# Patient Record
Sex: FEMALE | Race: BLACK OR AFRICAN AMERICAN | NOT HISPANIC OR LATINO | Employment: FULL TIME | URBAN - METROPOLITAN AREA
[De-identification: names, ages, dates, MRNs, and addresses within clinical notes are randomized per-mention and may not be internally consistent; named-entity substitution may affect disease eponyms.]

---

## 2021-03-11 ENCOUNTER — HOSPITAL ENCOUNTER (OUTPATIENT)
Facility: MEDICAL CENTER | Age: 26
End: 2021-03-11
Attending: SPECIALIST
Payer: MEDICAID

## 2021-03-11 LAB — CYTOLOGY REG CYTOL: NORMAL

## 2021-03-11 PROCEDURE — 88175 CYTOPATH C/V AUTO FLUID REDO: CPT

## 2021-05-20 ENCOUNTER — HOSPITAL ENCOUNTER (EMERGENCY)
Facility: MEDICAL CENTER | Age: 26
End: 2021-05-21
Attending: SPECIALIST | Admitting: SPECIALIST
Payer: MEDICAID

## 2021-05-20 ENCOUNTER — APPOINTMENT (OUTPATIENT)
Dept: RADIOLOGY | Facility: MEDICAL CENTER | Age: 26
End: 2021-05-20
Attending: SPECIALIST
Payer: MEDICAID

## 2021-05-20 VITALS
DIASTOLIC BLOOD PRESSURE: 65 MMHG | TEMPERATURE: 97.6 F | HEIGHT: 63 IN | BODY MASS INDEX: 32.07 KG/M2 | HEART RATE: 97 BPM | SYSTOLIC BLOOD PRESSURE: 123 MMHG | WEIGHT: 181 LBS

## 2021-05-20 PROCEDURE — 76815 OB US LIMITED FETUS(S): CPT

## 2021-05-20 PROCEDURE — 302449 STATCHG TRIAGE ONLY (STATISTIC)

## 2021-05-20 ASSESSMENT — PAIN SCALES - GENERAL: PAINLEVEL: 5 - MODERATE PAIN

## 2021-05-20 ASSESSMENT — PAIN DESCRIPTION - PAIN TYPE: TYPE: ACUTE PAIN
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
## 2021-05-21 NOTE — DISCHARGE INSTRUCTIONS
Please return for any signs of labor or bleeding vaginally.  Follow up with your OB in the am on Friday 5/21/21.  You may take OTC tylenol for pain and use any comfort measures that work for you, massage, ice pack or heat packs.  Call your OB with any concerns.
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
## 2021-06-07 LAB
ABO GROUP BLD: NORMAL
BLD GP AB SCN SERPL QL: NORMAL
HBV SURFACE AG SERPL QL IA: NON REACTIVE
HIV 1+2 AB+HIV1 P24 AG SERPL QL IA: NON REACTIVE
RH BLD: POSITIVE
RUBV IGG SERPL IA-ACNC: NORMAL
TREPONEMA PALLIDUM IGG+IGM AB [PRESENCE] IN SERUM OR PLASMA BY IMMUNOASSAY: NON REACTIVE

## 2021-06-21 DIAGNOSIS — D50.9 IRON DEFICIENCY ANEMIA, UNSPECIFIED IRON DEFICIENCY ANEMIA TYPE: ICD-10-CM

## 2021-06-21 RX ORDER — SODIUM CHLORIDE 9 MG/ML
INJECTION, SOLUTION INTRAVENOUS CONTINUOUS
Status: CANCELLED | OUTPATIENT
Start: 2021-06-22

## 2021-06-21 RX ORDER — DIPHENHYDRAMINE HYDROCHLORIDE 50 MG/ML
50 INJECTION INTRAMUSCULAR; INTRAVENOUS PRN
Status: CANCELLED | OUTPATIENT
Start: 2021-06-22

## 2021-06-21 RX ORDER — EPINEPHRINE 1 MG/ML(1)
0.5 AMPUL (ML) INJECTION PRN
Status: CANCELLED | OUTPATIENT
Start: 2021-06-22

## 2021-06-21 RX ORDER — METHYLPREDNISOLONE SODIUM SUCCINATE 125 MG/2ML
125 INJECTION, POWDER, LYOPHILIZED, FOR SOLUTION INTRAMUSCULAR; INTRAVENOUS PRN
Status: CANCELLED | OUTPATIENT
Start: 2021-06-22

## 2021-06-24 ENCOUNTER — OUTPATIENT INFUSION SERVICES (OUTPATIENT)
Dept: ONCOLOGY | Facility: MEDICAL CENTER | Age: 26
End: 2021-06-24
Attending: SPECIALIST
Payer: MEDICAID

## 2021-06-24 VITALS
RESPIRATION RATE: 18 BRPM | OXYGEN SATURATION: 98 % | HEIGHT: 64 IN | SYSTOLIC BLOOD PRESSURE: 125 MMHG | DIASTOLIC BLOOD PRESSURE: 59 MMHG | BODY MASS INDEX: 31.99 KG/M2 | HEART RATE: 112 BPM | WEIGHT: 187.39 LBS | TEMPERATURE: 98 F

## 2021-06-24 DIAGNOSIS — D50.9 IRON DEFICIENCY ANEMIA, UNSPECIFIED IRON DEFICIENCY ANEMIA TYPE: ICD-10-CM

## 2021-06-24 LAB
FERRITIN SERPL-MCNC: 8.3 NG/ML (ref 10–291)
IRON SATN MFR SERPL: 5 % (ref 15–55)
IRON SERPL-MCNC: 24 UG/DL (ref 40–170)
TIBC SERPL-MCNC: 443 UG/DL (ref 250–450)
UIBC SERPL-MCNC: 419 UG/DL (ref 110–370)

## 2021-06-24 PROCEDURE — 83550 IRON BINDING TEST: CPT

## 2021-06-24 PROCEDURE — 96375 TX/PRO/DX INJ NEW DRUG ADDON: CPT

## 2021-06-24 PROCEDURE — 96365 THER/PROPH/DIAG IV INF INIT: CPT

## 2021-06-24 PROCEDURE — 700111 HCHG RX REV CODE 636 W/ 250 OVERRIDE (IP): Performed by: SPECIALIST

## 2021-06-24 PROCEDURE — 82728 ASSAY OF FERRITIN: CPT

## 2021-06-24 PROCEDURE — 83540 ASSAY OF IRON: CPT

## 2021-06-24 PROCEDURE — 36415 COLL VENOUS BLD VENIPUNCTURE: CPT

## 2021-06-24 PROCEDURE — 700105 HCHG RX REV CODE 258: Performed by: SPECIALIST

## 2021-06-24 RX ORDER — METHYLPREDNISOLONE SODIUM SUCCINATE 125 MG/2ML
125 INJECTION, POWDER, LYOPHILIZED, FOR SOLUTION INTRAMUSCULAR; INTRAVENOUS PRN
Status: CANCELLED | OUTPATIENT
Start: 2021-06-24

## 2021-06-24 RX ORDER — DOCOSAHEXAENOIC ACID 200 MG
CAPSULE ORAL
COMMUNITY
Start: 2021-06-10 | End: 2022-03-24

## 2021-06-24 RX ORDER — ASPIRIN 81 MG/1
81 TABLET, CHEWABLE ORAL DAILY
COMMUNITY
End: 2022-03-24

## 2021-06-24 RX ORDER — SODIUM CHLORIDE 9 MG/ML
INJECTION, SOLUTION INTRAVENOUS CONTINUOUS
Status: CANCELLED | OUTPATIENT
Start: 2021-06-24

## 2021-06-24 RX ORDER — EPINEPHRINE 1 MG/ML(1)
0.5 AMPUL (ML) INJECTION PRN
Status: CANCELLED | OUTPATIENT
Start: 2021-06-24

## 2021-06-24 RX ORDER — METHYLPREDNISOLONE SODIUM SUCCINATE 125 MG/2ML
125 INJECTION, POWDER, LYOPHILIZED, FOR SOLUTION INTRAMUSCULAR; INTRAVENOUS PRN
Status: DISCONTINUED | OUTPATIENT
Start: 2021-06-24 | End: 2021-06-24 | Stop reason: ALTCHOICE

## 2021-06-24 RX ORDER — SODIUM CHLORIDE 9 MG/ML
INJECTION, SOLUTION INTRAVENOUS CONTINUOUS
Status: DISCONTINUED | OUTPATIENT
Start: 2021-06-24 | End: 2021-06-24 | Stop reason: ALTCHOICE

## 2021-06-24 RX ORDER — FERROUS SULFATE 325(65) MG
TABLET ORAL
COMMUNITY
Start: 2021-06-14 | End: 2022-03-24

## 2021-06-24 RX ORDER — ALBUTEROL SULFATE 90 UG/1
1-2 AEROSOL, METERED RESPIRATORY (INHALATION) EVERY 4 HOURS PRN
COMMUNITY
Start: 2021-06-07

## 2021-06-24 RX ORDER — DIPHENHYDRAMINE HYDROCHLORIDE 50 MG/ML
50 INJECTION INTRAMUSCULAR; INTRAVENOUS PRN
Status: CANCELLED | OUTPATIENT
Start: 2021-06-24

## 2021-06-24 RX ADMIN — METHYLPREDNISOLONE SODIUM SUCCINATE 125 MG: 125 INJECTION, POWDER, FOR SOLUTION INTRAMUSCULAR; INTRAVENOUS at 10:58

## 2021-06-24 RX ADMIN — SODIUM CHLORIDE 1000 MG: 9 INJECTION, SOLUTION INTRAVENOUS at 11:37

## 2021-06-24 NOTE — PROGRESS NOTES
[facility]lin [facility] infusion. Pt denied having any new or acute complaints today, reports this is her first iron infusion. Handout provided and questions answered, PIV started, had + blood return flushed briskly. Labs drawn as ordered. Pt given iron infusion per MAR and as prescribed, tolerated well, denied having any complaints during or after infusion. PIV discontinued, bleeding controlled with gauze and coban.  states she has schedulers' phone number if needed. Elin discharged home to self care.

## 2021-07-20 LAB — GLUCOSE TOL INTERP 786: 101

## 2021-09-17 ENCOUNTER — HOSPITAL ENCOUNTER (EMERGENCY)
Facility: MEDICAL CENTER | Age: 26
End: 2021-09-18
Attending: SPECIALIST | Admitting: SPECIALIST
Payer: MEDICAID

## 2021-09-17 PROCEDURE — 302449 STATCHG TRIAGE ONLY (STATISTIC)

## 2021-09-18 VITALS — BODY MASS INDEX: 34.96 KG/M2 | WEIGHT: 190 LBS | HEIGHT: 62 IN

## 2021-09-18 PROCEDURE — 59025 FETAL NON-STRESS TEST: CPT

## 2021-09-18 PROCEDURE — 700102 HCHG RX REV CODE 250 W/ 637 OVERRIDE(OP): Performed by: SPECIALIST

## 2021-09-18 PROCEDURE — A9270 NON-COVERED ITEM OR SERVICE: HCPCS | Performed by: SPECIALIST

## 2021-09-18 RX ORDER — ACETAMINOPHEN 500 MG
500 TABLET ORAL ONCE
Status: DISCONTINUED | OUTPATIENT
Start: 2021-09-18 | End: 2021-09-18

## 2021-09-18 RX ORDER — ACETAMINOPHEN 500 MG
1000 TABLET ORAL ONCE
Status: COMPLETED | OUTPATIENT
Start: 2021-09-18 | End: 2021-09-18

## 2021-09-18 RX ADMIN — ACETAMINOPHEN 1000 MG: 500 TABLET ORAL at 00:29

## 2021-09-18 ASSESSMENT — PAIN SCALES - GENERAL: PAINLEVEL: 10 - WORST POSSIBLE PAIN
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
## 2021-09-30 ENCOUNTER — HOSPITAL ENCOUNTER (EMERGENCY)
Facility: MEDICAL CENTER | Age: 26
End: 2021-09-30
Attending: SPECIALIST | Admitting: SPECIALIST
Payer: MEDICAID

## 2021-09-30 VITALS
HEART RATE: 90 BPM | DIASTOLIC BLOOD PRESSURE: 78 MMHG | BODY MASS INDEX: 34.96 KG/M2 | WEIGHT: 190 LBS | SYSTOLIC BLOOD PRESSURE: 131 MMHG | HEIGHT: 62 IN | RESPIRATION RATE: 20 BRPM | TEMPERATURE: 97.3 F

## 2021-09-30 PROCEDURE — 302449 STATCHG TRIAGE ONLY (STATISTIC)

## 2021-09-30 PROCEDURE — 59025 FETAL NON-STRESS TEST: CPT

## 2021-09-30 ASSESSMENT — PAIN SCALES - GENERAL: PAINLEVEL: 4

## 2021-09-30 NOTE — PROGRESS NOTES
"1400: Pt presents to L&D with c/o UC, pt was in department at 0030 this am with contractions and returned home. SVE 2/50/-2.   1500: Dr. Parisi updated on pt's status. Dr. Parisi states pt has not been to an appointment since February. Pt states she was \"suppose to call and make and appointment\" but she did not do it. Order received to collect urine drug screen, monitor patient, recheck her cervix and inform Dr. Parisi of any cervical or FHT changes.  1542: Dr. Parisi updated on pt's status and lack of cervical change, order received to discharge pt home. Pt to call Dr. Parisi's office and set up an appointment to be seen in the office on Friday October 1st.  1550: Discharge instructions given, pt verbalized understanding. Pt ambulated off unit in stable condition.  "

## 2021-10-02 NOTE — H&P
DATE OF ADMISSION:  2021     REASON FOR ADMISSION:  Induction of labor.     HISTORY OF PRESENT ILLNESS:  This is a 26-year-old  7, para 4,   spontaneous  2, who established care at 10 weeks' gestation, at which   time she had a transvaginal pelvic ultrasound.  She maintained care until 28   weeks' gestation, at which time the patient missed multiple visits.  The   patient had been sent a termination of care letter and did not respond. She   represented to labor and delivery on , approximately 2-1/2 months later,   stating our practice as the providers of her care. During that period of time,   the patient states that she had multiple attempts at uncontrolled asthma and   had run out of her inhaler.  The patient does not currently complain of   shortness of breath; however, does state that she has continued asthma   attacks.  The patient did have a biophysical profile on 10/01/2021 of  with   an amniotic fluid index of 13.97.  She also had an OB ultrasound to evaluate   growth with the estimated fetal weight at approximately the 15th percentile   with a posterior placenta, grade II, vertex presentation with an approximate   3-week lag in growth with a BPD consistent with 36 weeks 0 days, head   circumference consistent with 36 weeks and 2 days, abdominal circumference   consistent with 36 and 3/7 days, femur length consistent with 36 weeks and 5/7   and humeral length of 36 and 4/7 weeks' gestation with an average ultrasound   age of 36 and 3/7 week's gestation at 39 and 2/7 weeks' gestation. Given the   patient's complaints of uncontrolled asthma during this period of time with a   3-week growth lag a discussion regarding proceeding forward with an induction   of labor at her due date ensued given her overall reassuring fetal status.    Risks, benefits and alternatives have been addressed.  She has asked   appropriate questions, signed the appropriate consents and is wishing to   proceed  forward with admission at this time.     PAST MEDICAL HISTORY:  Asthma, migraine headaches, history of blood   transfusion, delivery of her last fetus in , anemia during this pregnancy.    She reports smoking throughout this entire pregnancy.  She did have a drug   tox screen while on labor and delivery on 2021.     PAST SURGICAL HISTORY:  None.     OBSTETRICAL HISTORY:  In , , , , patient had term vaginal   deliveries with the last pregnancy, all of which were in [place] complicated   by a blood transfusion.  She has had two spontaneous abortions.  This is her   seventh pregnancy.     SOCIAL HISTORY:  She denies use of any alcohol.  She does report tobacco use   throughout her entire pregnancy.  She denies any other drug use.     MEDICATIONS:  Albuterol inhaler p.r.n., prenatal vitamins.     ALLERGIES:  No known drug allergies.     PHYSICAL EXAMINATION:  VITAL SIGNS:  She is afebrile, hemodynamically stable.  Current vital signs   can be seen in electronic medical record.  HEART:  Regular rate and rhythm.  CHEST:  Clear to auscultation bilaterally.  ABDOMEN:  Soft, gravid, nontender.  PELVIC:  Sterile vaginal exam, 2, 50%, -2 station, vertex presentation was   confirmed.     LABORATORY DATA:  Group B Strep status currently pending.     ASSESSMENT AND PLAN:  A 26-year-old  7, para 4 at 40 weeks' gestation   based on a 10-week ultrasound with a 2-1/2 month period of time without any   prenatal care or calls for appointments despite attempts to contact the   patient with phone calls and a certified letter, now presenting with a desire   to proceed forward with an induction of labor given her reported uncontrolled   asthma as well as a 3-week growth lag on ultrasound.  Risks, benefits and   alternatives have been addressed and she wished to proceed forward with   admission at this time.        ______________________________  MD KYLE Cotton/ALEXEI/GINGER    DD:  10/01/2021  16:03  DT:  10/01/2021 16:32    Job#:  [id number]

## 2021-10-06 ENCOUNTER — HOSPITAL ENCOUNTER (INPATIENT)
Facility: MEDICAL CENTER | Age: 26
LOS: 2 days | End: 2021-10-08
Attending: SPECIALIST | Admitting: SPECIALIST
Payer: MEDICAID

## 2021-10-06 ENCOUNTER — ANESTHESIA (OUTPATIENT)
Dept: ANESTHESIOLOGY | Facility: MEDICAL CENTER | Age: 26
End: 2021-10-06
Payer: MEDICAID

## 2021-10-06 ENCOUNTER — APPOINTMENT (OUTPATIENT)
Dept: OBGYN | Facility: MEDICAL CENTER | Age: 26
End: 2021-10-06
Attending: SPECIALIST
Payer: MEDICAID

## 2021-10-06 ENCOUNTER — ANESTHESIA EVENT (OUTPATIENT)
Dept: ANESTHESIOLOGY | Facility: MEDICAL CENTER | Age: 26
End: 2021-10-06
Payer: MEDICAID

## 2021-10-06 LAB
AMPHET UR QL SCN: NEGATIVE
BARBITURATES UR QL SCN: NEGATIVE
BENZODIAZ UR QL SCN: NEGATIVE
BZE UR QL SCN: NEGATIVE
CANNABINOIDS UR QL SCN: NEGATIVE
ERYTHROCYTE [DISTWIDTH] IN BLOOD BY AUTOMATED COUNT: 53 FL (ref 35.9–50)
HCT VFR BLD AUTO: 35.1 % (ref 37–47)
HGB BLD-MCNC: 11.7 G/DL (ref 12–16)
HOLDING TUBE BB 8507: NORMAL
MCH RBC QN AUTO: 31.4 PG (ref 27–33)
MCHC RBC AUTO-ENTMCNC: 33.3 G/DL (ref 33.6–35)
MCV RBC AUTO: 94.1 FL (ref 81.4–97.8)
METHADONE UR QL SCN: NEGATIVE
OPIATES UR QL SCN: NEGATIVE
OXYCODONE UR QL SCN: NEGATIVE
PCP UR QL SCN: NEGATIVE
PLATELET # BLD AUTO: 356 K/UL (ref 164–446)
PMV BLD AUTO: 9.2 FL (ref 9–12.9)
PROPOXYPH UR QL SCN: NEGATIVE
RBC # BLD AUTO: 3.73 M/UL (ref 4.2–5.4)
SARS-COV+SARS-COV-2 AG RESP QL IA.RAPID: NOTDETECTED
SPECIMEN SOURCE: NORMAL
WBC # BLD AUTO: 11.2 K/UL (ref 4.8–10.8)

## 2021-10-06 PROCEDURE — 700111 HCHG RX REV CODE 636 W/ 250 OVERRIDE (IP): Performed by: ANESTHESIOLOGY

## 2021-10-06 PROCEDURE — 303615 HCHG EPIDURAL/SPINAL ANESTHESIA FOR LABOR

## 2021-10-06 PROCEDURE — 36415 COLL VENOUS BLD VENIPUNCTURE: CPT

## 2021-10-06 PROCEDURE — A9270 NON-COVERED ITEM OR SERVICE: HCPCS | Performed by: SPECIALIST

## 2021-10-06 PROCEDURE — 80307 DRUG TEST PRSMV CHEM ANLYZR: CPT

## 2021-10-06 PROCEDURE — 10907ZC DRAINAGE OF AMNIOTIC FLUID, THERAPEUTIC FROM PRODUCTS OF CONCEPTION, VIA NATURAL OR ARTIFICIAL OPENING: ICD-10-PCS | Performed by: SPECIALIST

## 2021-10-06 PROCEDURE — 700101 HCHG RX REV CODE 250: Performed by: ANESTHESIOLOGY

## 2021-10-06 PROCEDURE — 700105 HCHG RX REV CODE 258: Performed by: SPECIALIST

## 2021-10-06 PROCEDURE — 700111 HCHG RX REV CODE 636 W/ 250 OVERRIDE (IP): Performed by: SPECIALIST

## 2021-10-06 PROCEDURE — 59409 OBSTETRICAL CARE: CPT

## 2021-10-06 PROCEDURE — 304965 HCHG RECOVERY SERVICES

## 2021-10-06 PROCEDURE — 700102 HCHG RX REV CODE 250 W/ 637 OVERRIDE(OP): Performed by: SPECIALIST

## 2021-10-06 PROCEDURE — 770002 HCHG ROOM/CARE - OB PRIVATE (112)

## 2021-10-06 PROCEDURE — 302449 STATCHG TRIAGE ONLY (STATISTIC)

## 2021-10-06 PROCEDURE — 85027 COMPLETE CBC AUTOMATED: CPT

## 2021-10-06 PROCEDURE — 87426 SARSCOV CORONAVIRUS AG IA: CPT

## 2021-10-06 PROCEDURE — 3E033VJ INTRODUCTION OF OTHER HORMONE INTO PERIPHERAL VEIN, PERCUTANEOUS APPROACH: ICD-10-PCS | Performed by: SPECIALIST

## 2021-10-06 PROCEDURE — 10H07YZ INSERTION OF OTHER DEVICE INTO PRODUCTS OF CONCEPTION, VIA NATURAL OR ARTIFICIAL OPENING: ICD-10-PCS | Performed by: SPECIALIST

## 2021-10-06 RX ORDER — MISOPROSTOL 200 UG/1
600 TABLET ORAL
Status: DISCONTINUED | OUTPATIENT
Start: 2021-10-06 | End: 2021-10-08 | Stop reason: HOSPADM

## 2021-10-06 RX ORDER — HYDROCODONE BITARTRATE AND ACETAMINOPHEN 5; 325 MG/1; MG/1
2 TABLET ORAL EVERY 4 HOURS PRN
Status: DISCONTINUED | OUTPATIENT
Start: 2021-10-06 | End: 2021-10-08 | Stop reason: HOSPADM

## 2021-10-06 RX ORDER — ROPIVACAINE HYDROCHLORIDE 2 MG/ML
INJECTION, SOLUTION EPIDURAL; INFILTRATION
Status: COMPLETED | OUTPATIENT
Start: 2021-10-06 | End: 2021-10-06

## 2021-10-06 RX ORDER — ONDANSETRON 4 MG/1
4 TABLET, ORALLY DISINTEGRATING ORAL EVERY 6 HOURS PRN
Status: DISCONTINUED | OUTPATIENT
Start: 2021-10-06 | End: 2021-10-08 | Stop reason: HOSPADM

## 2021-10-06 RX ORDER — HYDROCODONE BITARTRATE AND ACETAMINOPHEN 5; 325 MG/1; MG/1
1 TABLET ORAL EVERY 4 HOURS PRN
Status: DISCONTINUED | OUTPATIENT
Start: 2021-10-06 | End: 2021-10-08 | Stop reason: HOSPADM

## 2021-10-06 RX ORDER — IBUPROFEN 600 MG/1
600 TABLET ORAL EVERY 6 HOURS PRN
Status: DISCONTINUED | OUTPATIENT
Start: 2021-10-06 | End: 2021-10-08 | Stop reason: HOSPADM

## 2021-10-06 RX ORDER — MISOPROSTOL 200 UG/1
800 TABLET ORAL
Status: DISCONTINUED | OUTPATIENT
Start: 2021-10-06 | End: 2021-10-06 | Stop reason: HOSPADM

## 2021-10-06 RX ORDER — SODIUM CHLORIDE, SODIUM LACTATE, POTASSIUM CHLORIDE, AND CALCIUM CHLORIDE .6; .31; .03; .02 G/100ML; G/100ML; G/100ML; G/100ML
250 INJECTION, SOLUTION INTRAVENOUS PRN
Status: DISCONTINUED | OUTPATIENT
Start: 2021-10-06 | End: 2021-10-06

## 2021-10-06 RX ORDER — SODIUM CHLORIDE, SODIUM LACTATE, POTASSIUM CHLORIDE, AND CALCIUM CHLORIDE .6; .31; .03; .02 G/100ML; G/100ML; G/100ML; G/100ML
1000 INJECTION, SOLUTION INTRAVENOUS
Status: DISCONTINUED | OUTPATIENT
Start: 2021-10-06 | End: 2021-10-06

## 2021-10-06 RX ORDER — VITAMIN A ACETATE, BETA CAROTENE, ASCORBIC ACID, CHOLECALCIFEROL, .ALPHA.-TOCOPHEROL ACETATE, DL-, THIAMINE MONONITRATE, RIBOFLAVIN, NIACINAMIDE, PYRIDOXINE HYDROCHLORIDE, FOLIC ACID, CYANOCOBALAMIN, CALCIUM CARBONATE, FERROUS FUMARATE, ZINC OXIDE, CUPRIC OXIDE 3080; 12; 120; 400; 1; 1.84; 3; 20; 22; 920; 25; 200; 27; 10; 2 [IU]/1; UG/1; MG/1; [IU]/1; MG/1; MG/1; MG/1; MG/1; MG/1; [IU]/1; MG/1; MG/1; MG/1; MG/1; MG/1
1 TABLET, FILM COATED ORAL
Status: DISCONTINUED | OUTPATIENT
Start: 2021-10-06 | End: 2021-10-08 | Stop reason: HOSPADM

## 2021-10-06 RX ORDER — ROPIVACAINE HYDROCHLORIDE 2 MG/ML
INJECTION, SOLUTION EPIDURAL; INFILTRATION; PERINEURAL CONTINUOUS
Status: DISCONTINUED | OUTPATIENT
Start: 2021-10-06 | End: 2021-10-08 | Stop reason: HOSPADM

## 2021-10-06 RX ORDER — TERBUTALINE SULFATE 1 MG/ML
0.25 INJECTION, SOLUTION SUBCUTANEOUS PRN
Status: DISCONTINUED | OUTPATIENT
Start: 2021-10-06 | End: 2021-10-06 | Stop reason: HOSPADM

## 2021-10-06 RX ORDER — HYDROXYZINE 50 MG/1
50 TABLET, FILM COATED ORAL EVERY 6 HOURS PRN
Status: DISCONTINUED | OUTPATIENT
Start: 2021-10-06 | End: 2021-10-06 | Stop reason: HOSPADM

## 2021-10-06 RX ORDER — ONDANSETRON 2 MG/ML
4 INJECTION INTRAMUSCULAR; INTRAVENOUS EVERY 6 HOURS PRN
Status: DISCONTINUED | OUTPATIENT
Start: 2021-10-06 | End: 2021-10-08 | Stop reason: HOSPADM

## 2021-10-06 RX ORDER — CITRIC ACID/SODIUM CITRATE 334-500MG
30 SOLUTION, ORAL ORAL EVERY 6 HOURS PRN
Status: DISCONTINUED | OUTPATIENT
Start: 2021-10-06 | End: 2021-10-06 | Stop reason: HOSPADM

## 2021-10-06 RX ORDER — BISACODYL 10 MG
10 SUPPOSITORY, RECTAL RECTAL PRN
Status: DISCONTINUED | OUTPATIENT
Start: 2021-10-06 | End: 2021-10-08 | Stop reason: HOSPADM

## 2021-10-06 RX ORDER — ROPIVACAINE HYDROCHLORIDE 2 MG/ML
INJECTION, SOLUTION EPIDURAL; INFILTRATION; PERINEURAL CONTINUOUS
Status: DISCONTINUED | OUTPATIENT
Start: 2021-10-06 | End: 2021-10-06

## 2021-10-06 RX ORDER — SODIUM CHLORIDE, SODIUM LACTATE, POTASSIUM CHLORIDE, CALCIUM CHLORIDE 600; 310; 30; 20 MG/100ML; MG/100ML; MG/100ML; MG/100ML
INJECTION, SOLUTION INTRAVENOUS CONTINUOUS
Status: DISCONTINUED | OUTPATIENT
Start: 2021-10-06 | End: 2021-10-08 | Stop reason: HOSPADM

## 2021-10-06 RX ORDER — METHYLERGONOVINE MALEATE 0.2 MG/ML
0.2 INJECTION INTRAVENOUS
Status: DISCONTINUED | OUTPATIENT
Start: 2021-10-06 | End: 2021-10-08 | Stop reason: HOSPADM

## 2021-10-06 RX ORDER — ACETAMINOPHEN 325 MG/1
325 TABLET ORAL EVERY 4 HOURS PRN
Status: DISCONTINUED | OUTPATIENT
Start: 2021-10-06 | End: 2021-10-08 | Stop reason: HOSPADM

## 2021-10-06 RX ORDER — CARBOPROST TROMETHAMINE 250 UG/ML
250 INJECTION, SOLUTION INTRAMUSCULAR
Status: DISCONTINUED | OUTPATIENT
Start: 2021-10-06 | End: 2021-10-08 | Stop reason: HOSPADM

## 2021-10-06 RX ORDER — LIDOCAINE HYDROCHLORIDE AND EPINEPHRINE 15; 5 MG/ML; UG/ML
INJECTION, SOLUTION EPIDURAL
Status: COMPLETED | OUTPATIENT
Start: 2021-10-06 | End: 2021-10-06

## 2021-10-06 RX ORDER — SODIUM CHLORIDE, SODIUM LACTATE, POTASSIUM CHLORIDE, CALCIUM CHLORIDE 600; 310; 30; 20 MG/100ML; MG/100ML; MG/100ML; MG/100ML
INJECTION, SOLUTION INTRAVENOUS PRN
Status: DISCONTINUED | OUTPATIENT
Start: 2021-10-06 | End: 2021-10-08 | Stop reason: HOSPADM

## 2021-10-06 RX ORDER — DOCUSATE SODIUM 100 MG/1
100 CAPSULE, LIQUID FILLED ORAL 2 TIMES DAILY PRN
Status: DISCONTINUED | OUTPATIENT
Start: 2021-10-06 | End: 2021-10-08 | Stop reason: HOSPADM

## 2021-10-06 RX ADMIN — DOCUSATE SODIUM 100 MG: 100 CAPSULE ORAL at 21:11

## 2021-10-06 RX ADMIN — OXYTOCIN 2 MILLI-UNITS/MIN: 10 INJECTION, SOLUTION INTRAMUSCULAR; INTRAVENOUS at 05:22

## 2021-10-06 RX ADMIN — ROPIVACAINE HYDROCHLORIDE 2 ML: 2 INJECTION, SOLUTION EPIDURAL; INFILTRATION at 09:37

## 2021-10-06 RX ADMIN — LIDOCAINE HYDROCHLORIDE,EPINEPHRINE BITARTRATE 5 ML: 15; .005 INJECTION, SOLUTION EPIDURAL; INFILTRATION; INTRACAUDAL; PERINEURAL at 09:37

## 2021-10-06 RX ADMIN — IBUPROFEN 600 MG: 600 TABLET ORAL at 21:11

## 2021-10-06 RX ADMIN — ROPIVACAINE HYDROCHLORIDE: 2 INJECTION, SOLUTION EPIDURAL; INFILTRATION at 10:10

## 2021-10-06 RX ADMIN — OXYTOCIN 2000 ML/HR: 10 INJECTION, SOLUTION INTRAMUSCULAR; INTRAVENOUS at 14:25

## 2021-10-06 RX ADMIN — OXYTOCIN 125 ML/HR: 10 INJECTION, SOLUTION INTRAMUSCULAR; INTRAVENOUS at 14:45

## 2021-10-06 RX ADMIN — HYDROCODONE BITARTRATE AND ACETAMINOPHEN 2 TABLET: 5; 325 TABLET ORAL at 21:11

## 2021-10-06 RX ADMIN — SODIUM CHLORIDE, POTASSIUM CHLORIDE, SODIUM LACTATE AND CALCIUM CHLORIDE: 600; 310; 30; 20 INJECTION, SOLUTION INTRAVENOUS at 09:21

## 2021-10-06 RX ADMIN — HYDROCODONE BITARTRATE AND ACETAMINOPHEN 2 TABLET: 5; 325 TABLET ORAL at 14:44

## 2021-10-06 RX ADMIN — SODIUM CHLORIDE, POTASSIUM CHLORIDE, SODIUM LACTATE AND CALCIUM CHLORIDE: 600; 310; 30; 20 INJECTION, SOLUTION INTRAVENOUS at 05:22

## 2021-10-06 ASSESSMENT — PATIENT HEALTH QUESTIONNAIRE - PHQ9
1. LITTLE INTEREST OR PLEASURE IN DOING THINGS: NOT AT ALL
SUM OF ALL RESPONSES TO PHQ9 QUESTIONS 1 AND 2: 0
2. FEELING DOWN, DEPRESSED, IRRITABLE, OR HOPELESS: NOT AT ALL

## 2021-10-06 ASSESSMENT — LIFESTYLE VARIABLES
HAVE PEOPLE ANNOYED YOU BY CRITICIZING YOUR DRINKING: NO
EVER HAD A DRINK FIRST THING IN THE MORNING TO STEADY YOUR NERVES TO GET RID OF A HANGOVER: NO
EVER_SMOKED: NEVER
EVER FELT BAD OR GUILTY ABOUT YOUR DRINKING: NO
TOTAL SCORE: 0
DOES PATIENT WANT TO STOP DRINKING: NO
TOTAL SCORE: 0
HOW MANY TIMES IN THE PAST YEAR HAVE YOU HAD 5 OR MORE DRINKS IN A DAY: 0
TOTAL SCORE: 0
HAVE YOU EVER FELT YOU SHOULD CUT DOWN ON YOUR DRINKING: NO
ON A TYPICAL DAY WHEN YOU DRINK ALCOHOL HOW MANY DRINKS DO YOU HAVE: 0
CONSUMPTION TOTAL: NEGATIVE
ALCOHOL_USE: NO
AVERAGE NUMBER OF DAYS PER WEEK YOU HAVE A DRINK CONTAINING ALCOHOL: 0

## 2021-10-06 ASSESSMENT — PAIN SCALES - GENERAL
PAIN_LEVEL: 0
PAINLEVEL: 0 - NO PAIN

## 2021-10-06 ASSESSMENT — PAIN DESCRIPTION - PAIN TYPE
TYPE: ACUTE PAIN

## 2021-10-06 NOTE — PROGRESS NOTES
"1300 - Assumed care of pt. Report received from Melanie GUILLEN. Pt comfortable on left side with peanut ball.   1353 - SVE: 8/90/-1. Pt called FOB to return to bedside.   1358 - Call placed to Dr. Parisi, message left. Room set for delivery.   1405 - Pt feeling increased pressure. SVE: C/0.   1407 - Call placed to Dr. Parisi. Charge RN calling office.   1415 - Call received back from Dr. Parisi. Recheck SVE per MD order to see station, C/+2. MD on way and will be at bedside in 5 min.   1423 - Dr. Parisi and FOB to bedside.   1424 - Pt began pushing with MD. Del of viable infant male. Apgars 8/9. Tight nuchal x1.   1610 - Per Social Work Leni, needing a Urine tox screen. Urine collected off jane catheter from before delivery at 1420. Per pt, no meth use, pt states \"she took Norco after one of her son's deliveries and it showed she was positive for meth.\" Pt states no marijuana use for months. Pt agreeable to urine tox screen, urine sent to lab.   1700 - Pt up to bathroom. Able to void. Pt taken up to postpartum. Report given to Elvira GUILLEN.   "

## 2021-10-06 NOTE — L&D DELIVERY NOTE
DATE OF SERVICE:  10/06/2021     Briefly, this is a 26-year-old  7, para 4, spontaneous  2,   established care at 10 weeks' gestation who did lose care; however,   represented at term and now electing to proceed forward with an induction of   labor, was started on Pitocin per protocol.  She did have an artificial   rupture of membranes performed at approximately 3 cm dilation.  Clear amniotic   fluid was appreciated.  Intrauterine pressure catheter was placed.  The   patient was granted continuous lumbar epidural to optimize pain management,   progressed well, arrived to complete, complete and +1 to +2 and over a few   contractions subsequently underwent a spontaneous vaginal delivery over an   intact perineum with a tight nuchal cord reduced on the perineum with easy   delivery of the shoulders and body.  Cord was clamped and cut.  Infant was   handed to waiting nursing staff.  Apgars were 8 and 9 at one and five minutes   respectively.  Placenta was delivered spontaneous and intact with 3-vessel   cord.  Estimated blood loss for the delivery was 300 mL. There were no   lacerations for repair.  The patient tolerated labor and delivery well.        ______________________________  MD KYLE Cotton/JOHN    DD:  10/06/2021 14:37  DT:  10/06/2021 16:51    Job#:  [id number]

## 2021-10-06 NOTE — PROGRESS NOTES
40-0    0400 - Pt arrived for IOL. Placed in S221.   0414 - Monitors placedx2. Pt report +FM, denies LOF, contractions, vaginal bleeding. FOB at bedside. SVE as noted in flowsheet. Discussed POC. Pt expresses understanding.   0700 - Report given to Myah GUILLEN. POC discussed.

## 2021-10-06 NOTE — CARE PLAN
The patient is Stable - Low risk of patient condition declining or worsening    Shift Goals  Clinical Goals: Make cervical change    Progress made toward(s) clinical / shift goals:    Problem: Psychosocial - L&D  Goal: Patient's ability to re-evaluate and adapt role responsibilities will improve  Outcome: Progressing  Goal: Spiritual and cultural needs incorporated into hospitalization  Outcome: Progressing     Problem: Risk for Infection and Impaired Wound Healing  Goal: Patient will remain free from infection  Outcome: Progressing     Problem: Risk for Fluid Imbalance  Goal: Patient's fluid volume balance will be maintained or improve  Outcome: Progressing     Problem: Risk for Injury  Goal: Patient and fetus will be free of preventable injury/complications  Outcome: Progressing     Problem: Pain  Goal: Patient's pain will be alleviated or reduced to the patient’s comfort goal  Outcome: Progressing

## 2021-10-06 NOTE — ANESTHESIA PROCEDURE NOTES
Epidural Block    Date/Time: 10/6/2021 9:37 AM  Performed by: Kyle Garcia M.D.  Authorized by: Kyle Garcia M.D.     Patient Location:  OB  Start Time:  10/6/2021 9:37 AM  End Time:  10/6/2021 9:50 AM  Reason for Block: labor analgesia    patient identified, IV checked, site marked, risks and benefits discussed, surgical consent, monitors and equipment checked, pre-op evaluation and timeout performed    Patient Position:  Sitting  Prep: ChloraPrep, patient draped and sterile technique    Monitoring:  Blood pressure, continuous pulse oximetry and heart rate  Approach:  Midline  Location:  L3-L4  Injection Technique:  NOEMY saline  Skin infiltration:  Lidocaine  Strength:  1%  Dose:  3ml  Needle Type:  Tuohy  Needle Gauge:  17 G  Needle Length:  3.5 in  Loss of resistance::  7  Catheter Size:  19 G  Catheter at Skin Depth:  11  Test Dose Result:  Negative   Single pass    25 g pencil poin to csf.    2 ml ropiv injected

## 2021-10-06 NOTE — PROGRESS NOTES
0700 received report from Deanna GUILLEN - patient had light breakfast and is asleep.    0748 Dr Parisi at bedside AROM 2-3/80/-2 clear fluid    0950 Dr Garcia at bedside to place epidural - time out completed. Infusing, clean dry and intact.    Patient repositioned side to side    1105 check 2-3/80/-2    1212 bolus started 500ml    1300 Report given to Gerda GUILLEN

## 2021-10-06 NOTE — PROGRESS NOTES
"Progress Note    Subjective:   Doing well. No issues or concerns. Feeling occasional uterine contractions    Objective Data:  Recent Labs     10/06/21  0500   WBC 11.2*   RBC 3.73*   HEMOGLOBIN 11.7*   HEMATOCRIT 35.1*   MCV 94.1   MCH 31.4   MCHC 33.3*   RDW 53.0*   PLATELETCT 356   MPV 9.2           Vitals:    10/06/21 0431 10/06/21 0452   BP: 126/64 119/64   Pulse: (!) 102 (!) 103   Resp: 16 16   Temp: 37.2 °C (99 °F) 37.2 °C (99 °F)   TempSrc: Temporal Temporal   SpO2: 95% 95%   Weight:  85.7 kg (189 lb)   Height:  1.575 m (5' 2\")     Abdomen: soft gravid non tender  SVE: 3cm/ 80%/-1 to -2 Vtx AROM clear fluid IUPC placed  Ext: non tender calves    FHTs: 140s with GBTBV  [place]: irregular uterine contractions    Intake/Output Summary (Last 24 hours) at 10/6/2021 0809  Last data filed at 10/6/2021 0600  Gross per 24 hour   Intake 125 ml   Output --   Net 125 ml       Current Facility-Administered Medications   Medication Dose Route Frequency Provider Last Rate Last Admin   • oxytocin (PITOCIN) 20 UNITS/1000ML LR (induction of labor)  0.5-20 rosas-units/min Intravenous Continuous Siddhartha Parisi M.D. 18 mL/hr at 10/06/21 0645 6 rosas-units/min at 10/06/21 0645   • lactated ringers infusion   Intravenous Continuous Siddhartha Parisi M.D. 125 mL/hr at 10/06/21 0522 New Bag at 10/06/21 0522   • fentaNYL (SUBLIMAZE) injection 50 mcg  50 mcg Intravenous Q HOUR PRN Siddhartha Parisi M.D.       • fentaNYL (SUBLIMAZE) injection 100 mcg  100 mcg Intravenous Q HOUR PRN Siddhartha Parisi M.D.       • terbutaline (BRETHINE) injection 0.25 mg  0.25 mg Intravenous PRN Siddhartha Parisi M.D.       • hydrOXYzine HCl (ATARAX) tablet 50 mg  50 mg Oral Q6HRS PRN Siddhartha Parisi M.D.       • Na citrate-citric acid (BICITRA) 500-334 MG/5ML solution 30 mL  30 mL Oral Q6HRS PRN Siddhartha Parisi M.D.       • miSOPROStol (CYTOTEC) tablet 800 mcg  800 mcg Rectal Once PRN Siddhartha Parisi M.D.           A/P 27 yo  at 40 weeks gestation now " admitted undergoing an Pitocin augmentation now at 6 MIU and S/P AROM with IUPC placed with overall reassuring fetal status. Continue with the present management. All questions were answered.

## 2021-10-06 NOTE — OR SURGEON
Immediate Delivery Note        Estimated Blood Loss: 300 ccs    Findings:  over an intact perineum with tight nuchal cord reduced on the perineum with easy delivery of the shoulders and body with the Apgars of 8/9 at one and five minutes with the placenta delivered spontaneous and intact with 3vc. No lacerations for repair.    Complications: None        10/6/2021 2:31 PM Siddhartha Parisi M.D.

## 2021-10-07 LAB
ERYTHROCYTE [DISTWIDTH] IN BLOOD BY AUTOMATED COUNT: 51.8 FL (ref 35.9–50)
HCT VFR BLD AUTO: 28.9 % (ref 37–47)
HGB BLD-MCNC: 9.5 G/DL (ref 12–16)
MCH RBC QN AUTO: 30.9 PG (ref 27–33)
MCHC RBC AUTO-ENTMCNC: 32.9 G/DL (ref 33.6–35)
MCV RBC AUTO: 94.1 FL (ref 81.4–97.8)
PLATELET # BLD AUTO: 292 K/UL (ref 164–446)
PMV BLD AUTO: 9.3 FL (ref 9–12.9)
RBC # BLD AUTO: 3.07 M/UL (ref 4.2–5.4)
WBC # BLD AUTO: 14 K/UL (ref 4.8–10.8)

## 2021-10-07 PROCEDURE — 36415 COLL VENOUS BLD VENIPUNCTURE: CPT

## 2021-10-07 PROCEDURE — A9270 NON-COVERED ITEM OR SERVICE: HCPCS | Performed by: SPECIALIST

## 2021-10-07 PROCEDURE — 700102 HCHG RX REV CODE 250 W/ 637 OVERRIDE(OP): Performed by: SPECIALIST

## 2021-10-07 PROCEDURE — 770002 HCHG ROOM/CARE - OB PRIVATE (112)

## 2021-10-07 PROCEDURE — 85027 COMPLETE CBC AUTOMATED: CPT

## 2021-10-07 RX ADMIN — HYDROCODONE BITARTRATE AND ACETAMINOPHEN 2 TABLET: 5; 325 TABLET ORAL at 22:43

## 2021-10-07 RX ADMIN — HYDROCODONE BITARTRATE AND ACETAMINOPHEN 1 TABLET: 5; 325 TABLET ORAL at 15:36

## 2021-10-07 RX ADMIN — PRENATAL WITH FERROUS FUM AND FOLIC ACID 1 TABLET: 3080; 920; 120; 400; 22; 1.84; 3; 20; 10; 1; 12; 200; 27; 25; 2 TABLET ORAL at 07:57

## 2021-10-07 RX ADMIN — HYDROCODONE BITARTRATE AND ACETAMINOPHEN 1 TABLET: 5; 325 TABLET ORAL at 11:05

## 2021-10-07 RX ADMIN — IBUPROFEN 600 MG: 600 TABLET ORAL at 22:43

## 2021-10-07 RX ADMIN — HYDROCODONE BITARTRATE AND ACETAMINOPHEN 2 TABLET: 5; 325 TABLET ORAL at 01:47

## 2021-10-07 RX ADMIN — HYDROCODONE BITARTRATE AND ACETAMINOPHEN 2 TABLET: 5; 325 TABLET ORAL at 05:30

## 2021-10-07 RX ADMIN — IBUPROFEN 600 MG: 600 TABLET ORAL at 04:18

## 2021-10-07 ASSESSMENT — PAIN DESCRIPTION - PAIN TYPE
TYPE: ACUTE PAIN

## 2021-10-07 NOTE — CARE PLAN
The patient is Stable - Low risk of patient condition declining or worsening    Shift Goals  Clinical Goals: Stable VS, pain controlled, lochia WDL    Progress made toward(s) clinical / shift goals: VSS, pt receiving pain medication as ordered, lochia WDL    Patient is not progressing towards the following goals:      Problem: Risk for Excess Fluid Volume  Goal: Patient will demonstrate pulse, blood pressure and neurologic signs within expected ranges and without any respiratory complications  Outcome: Progressing     Problem: Pain - Standard  Goal: Alleviation of pain or a reduction in pain to the patient’s comfort goal  Outcome: Progressing     Problem: Knowledge Deficit - Standard  Goal: Patient and family/care givers will demonstrate understanding of plan of care, disease process/condition, diagnostic tests and medications  Outcome: Progressing     Problem: Knowledge Deficit - Postpartum  Goal: Patient will verbalize and demonstrate understanding of self and infant care  Outcome: Progressing

## 2021-10-07 NOTE — PROGRESS NOTES
Progress Note    Subjective:   Doing well. No issues or concerns. Pain well controlled.     Objective Data:  Recent Labs     10/06/21  0500 10/07/21  0210   WBC 11.2* 14.0*   RBC 3.73* 3.07*   HEMOGLOBIN 11.7* 9.5*   HEMATOCRIT 35.1* 28.9*   MCV 94.1 94.1   MCH 31.4 30.9   MCHC 33.3* 32.9*   RDW 53.0* 51.8*   PLATELETCT 356 292   MPV 9.2 9.3           Vitals:    10/06/21 1800 10/06/21 2200 10/07/21 0200 10/07/21 0600   BP: 124/69 122/74 104/54 102/57   Pulse: 92 77 78 70   Resp: 15 18 16 18   Temp: 37.1 °C (98.7 °F) 36.8 °C (98.2 °F) 36.3 °C (97.4 °F) 36.7 °C (98 °F)   TempSrc: Temporal Temporal Temporal Temporal   SpO2: 99% 99% 98% 93%   Weight:       Height:         Abdomen: soft non tender fundus  Perineum: no sig bleeding or discharge  Ext: non tender calves    Intake/Output Summary (Last 24 hours) at 10/7/2021 0930  Last data filed at 10/6/2021 1424  Gross per 24 hour   Intake --   Output 800 ml   Net -800 ml       Current Facility-Administered Medications   Medication Dose Route Frequency Provider Last Rate Last Admin   • oxytocin (PITOCIN) 20 UNITS/1000ML LR (induction of labor)  0.5-20 rosas-units/min Intravenous Continuous Siddhartha Parisi M.D. 24 mL/hr at 10/06/21 0758 8 rosas-units/min at 10/06/21 0758   • lactated ringers infusion   Intravenous Continuous Siddhartha Parisi M.D. 125 mL/hr at 10/06/21 0921 New Bag at 10/06/21 0921   • ondansetron (ZOFRAN ODT) dispertab 4 mg  4 mg Oral Q6HRS PRN Siddhartha Parisi M.D.        Or   • ondansetron (ZOFRAN) syringe/vial injection 4 mg  4 mg Intravenous Q6HRS PRN Siddhartha Parisi M.D.       • oxytocin (PITOCIN) infusion (for postpartum)   mL/hr Intravenous Continuous Siddhartha Parisi M.D. 125 mL/hr at 10/06/21 1445 125 mL/hr at 10/06/21 1445   • ibuprofen (MOTRIN) tablet 600 mg  600 mg Oral Q6HRS PRN Siddhartha Parisi M.D.   600 mg at 10/07/21 0418   • acetaminophen (Tylenol) tablet 325 mg  325 mg Oral Q4HRS PRN Siddhartha Parisi M.D.       •  HYDROcodone-acetaminophen (NORCO) 5-325 MG per tablet 1 Tablet  1 Tablet Oral Q4HRS PRN Siddhartha Parisi M.D.       • HYDROcodone-acetaminophen (NORCO) 5-325 MG per tablet 2 Tablet  2 Tablet Oral Q4HRS PRN Siddhartha Parisi M.D.   2 Tablet at 10/07/21 0530   • ropivacaine 0.2 % (NAROPIN) injection   Epidural Continuous Kyle Garcia M.D.   New Bag at 10/06/21 1010   • LR infusion   Intravenous PRN Siddhartha Parisi M.D.       • tetanus-dipth-acell pertussis (Tdap) inj 0.5 mL  0.5 mL Intramuscular Once PRN Siddhartha Parisi M.D.       • measles, mumps and rubella vaccine (MMR) injection 0.5 mL  0.5 mL Subcutaneous Once PRN Siddhartha Parisi M.D.       • PRN oxytocin (PITOCIN) (20 Units/1000 mL) PRN for excessive uterine bleeding - See Admin Instr  125-999 mL/hr Intravenous Once PRN Siddhartha Parisi M.D.       • miSOPROStol (CYTOTEC) tablet 600 mcg  600 mcg Rectal Once PRN Siddhartha Parisi M.D.       • methylergonovine (METHERGINE) injection 0.2 mg  0.2 mg Intramuscular Once PRN Siddhartha Parisi M.D.       • carboPROST (HEMABATE) injection 250 mcg  250 mcg Intramuscular Once PRN Siddhartha Parisi M.D.       • docusate sodium (COLACE) capsule 100 mg  100 mg Oral BID PRN Siddhartha Parisi M.D.   100 mg at 10/06/21 2111   • bisacodyl (DULCOLAX) suppository 10 mg  10 mg Rectal PRN Siddhartha Parisi M.D.       • magnesium hydroxide (MILK OF MAGNESIA) suspension 30 mL  30 mL Oral Q6HRS PRN Siddhartha Parisi M.D.       • prenatal plus vitamin (STUARTNATAL 1+1) 27-1 MG tablet 1 Tablet  1 Tablet Oral Daily-0800 Siddhartha Parisi M.D.   1 Tablet at 10/07/21 0757       A/P S/P  now PPD #1. Plan to proceed with the usual pp management and will discharge home in am. All questions were answered.

## 2021-10-07 NOTE — ANESTHESIA TIME REPORT
Anesthesia Start and Stop Event Times     Date Time Event    10/6/2021 0936 Anesthesia Start     1424 Anesthesia Stop        Responsible Staff  10/06/21    Name Role Begin End    Kyle Garcia M.D. Anesth 0936 1424        Preop Diagnosis (Free Text):  Pre-op Diagnosis             Preop Diagnosis (Codes):    Premium Reason  Non-Premium    Comments:

## 2021-10-07 NOTE — PROGRESS NOTES
2055 Assessment completed. Lochia light, fundus firm. Plan of care reviewed. Reports pain of 9/10, cramping, see MAR for intervention.

## 2021-10-07 NOTE — ANESTHESIA POSTPROCEDURE EVALUATION
Patient: Princess Sarah Santoro    Procedure Summary     Date: 10/06/21 Room / Location:     Anesthesia Start: 0936 Anesthesia Stop: 1424    Procedure: Labor Epidural Diagnosis:     Scheduled Providers:  Responsible Provider: Kyle Gracia M.D.    Anesthesia Type: epidural ASA Status: 2          Final Anesthesia Type: epidural  Last vitals  BP   Blood Pressure: 120/67    Temp   36.9 °C (98.4 °F)    Pulse   (!) 113   Resp   16    SpO2   95 %      Anesthesia Post Evaluation    Patient location during evaluation: PACU  Patient participation: complete - patient participated  Level of consciousness: awake and alert  Pain score: 0    Airway patency: patent  Anesthetic complications: no  Cardiovascular status: hemodynamically stable  Respiratory status: acceptable  Hydration status: euvolemic    PONV: none          No complications documented.     Nurse Pain Score: 6 (NPRS)

## 2021-10-07 NOTE — LACTATION NOTE
This note was copied from a baby's chart.  26r, , 40wk, limited PNC, CPS involvement and no custody of other children, baby bagged for tox screening    Upon entering room, baby is bundle wrapped in moms arms and mom on phone.  MOB tells LC that she is wanting to breastfeed this baby and did not breastfeed her other children.  Recommended unwrapping baby and keeping baby skin to skin with mom while mom is awake.  Offer accepted to assist with breastfeeding. Baby asleep.  Wakes up briefly and then quickly back to sleep.      Educated on feeding baby frequently, with cues, and at least 8-10 times every 24 hours.  Educated on recommendation to avoid pacifiers and formula during the early weeks of life to avoid latch and milk production problems.  Encouraged to keep baby skin to skin and to call for assistance when he shows feeding cues and LC will return to assistt with latch.
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
## 2021-10-07 NOTE — CARE PLAN
The patient is Stable - Low risk of patient condition declining or worsening    Shift Goals  Clinical Goals: Maintain stable VS; Pain WDL; Lochia WDL    Progress made toward(s) clinical / shift goals:    Problem: Risk for Excess Fluid Volume  Goal: Patient will demonstrate pulse, blood pressure and neurologic signs within expected ranges and without any respiratory complications  Outcome: Progressing     Problem: Pain - Standard  Goal: Alleviation of pain or a reduction in pain to the patient’s comfort goal  Outcome: Progressing     Problem: Knowledge Deficit - Standard  Goal: Patient and family/care givers will demonstrate understanding of plan of care, disease process/condition, diagnostic tests and medications  Outcome: Progressing     Problem: Knowledge Deficit - Postpartum  Goal: Patient will verbalize and demonstrate understanding of self and infant care  Outcome: Progressing     Problem: Psychosocial - Postpartum  Goal: Patient will verbalize and demonstrate effective bonding and parenting behavior  Outcome: Progressing     Problem: Altered Physiologic Condition  Goal: Patient physiologically stable as evidenced by normal lochia, palpable uterine involution and vitals within normal limits  Outcome: Progressing     Problem: Infection - Postpartum  Goal: Postpartum patient will be free of signs and symptoms of infection  Outcome: Progressing

## 2021-10-08 VITALS
WEIGHT: 189 LBS | BODY MASS INDEX: 34.78 KG/M2 | RESPIRATION RATE: 16 BRPM | DIASTOLIC BLOOD PRESSURE: 39 MMHG | HEIGHT: 62 IN | SYSTOLIC BLOOD PRESSURE: 100 MMHG | HEART RATE: 67 BPM | OXYGEN SATURATION: 100 % | TEMPERATURE: 97.2 F

## 2021-10-08 PROCEDURE — 700102 HCHG RX REV CODE 250 W/ 637 OVERRIDE(OP): Performed by: SPECIALIST

## 2021-10-08 PROCEDURE — A9270 NON-COVERED ITEM OR SERVICE: HCPCS | Performed by: SPECIALIST

## 2021-10-08 RX ADMIN — HYDROCODONE BITARTRATE AND ACETAMINOPHEN 2 TABLET: 5; 325 TABLET ORAL at 10:54

## 2021-10-08 RX ADMIN — PRENATAL WITH FERROUS FUM AND FOLIC ACID 1 TABLET: 3080; 920; 120; 400; 22; 1.84; 3; 20; 10; 1; 12; 200; 27; 25; 2 TABLET ORAL at 10:52

## 2021-10-08 RX ADMIN — IBUPROFEN 600 MG: 600 TABLET ORAL at 10:52

## 2021-10-08 RX ADMIN — DOCUSATE SODIUM 100 MG: 100 CAPSULE ORAL at 10:52

## 2021-10-08 ASSESSMENT — EDINBURGH POSTNATAL DEPRESSION SCALE (EPDS)
I HAVE BEEN ABLE TO LAUGH AND SEE THE FUNNY SIDE OF THINGS: AS MUCH AS I ALWAYS COULD
I HAVE FELT SCARED OR PANICKY FOR NO GOOD REASON: NO, NOT AT ALL
THINGS HAVE BEEN GETTING ON TOP OF ME: YES, MOST OF THE TIME I HAVEN'T BEEN ABLE TO COPE AT ALL
I HAVE BEEN SO UNHAPPY THAT I HAVE HAD DIFFICULTY SLEEPING: NOT AT ALL
I HAVE BEEN SO UNHAPPY THAT I HAVE BEEN CRYING: NO, NEVER
THE THOUGHT OF HARMING MYSELF HAS OCCURRED TO ME: NEVER
I HAVE BLAMED MYSELF UNNECESSARILY WHEN THINGS WENT WRONG: NO, NEVER
I HAVE LOOKED FORWARD WITH ENJOYMENT TO THINGS: AS MUCH AS I EVER DID
I HAVE FELT SAD OR MISERABLE: NO, NOT AT ALL
I HAVE BEEN ANXIOUS OR WORRIED FOR NO GOOD REASON: NO, NOT AT ALL

## 2021-10-08 ASSESSMENT — PAIN DESCRIPTION - PAIN TYPE
TYPE: ACUTE PAIN

## 2021-10-08 NOTE — PROGRESS NOTES
Received report from WILMER De Jesus. Infant at bedside in open crib no signs of distress. Pt resting in bed, discussed plan of care and pain management for the night. Pt states she will call staff if she requires pain interventions or assistance through the day. No further needs at this time.

## 2021-10-08 NOTE — PROGRESS NOTES
Progress Note    Subjective:   Doing well. No issues or concerns. Pain well controlled. No sig bleeding or discharge.    Objective Data:  Recent Labs     10/06/21  0500 10/07/21  0210   WBC 11.2* 14.0*   RBC 3.73* 3.07*   HEMOGLOBIN 11.7* 9.5*   HEMATOCRIT 35.1* 28.9*   MCV 94.1 94.1   MCH 31.4 30.9   MCHC 33.3* 32.9*   RDW 53.0* 51.8*   PLATELETCT 356 292   MPV 9.2 9.3           Vitals:    10/07/21 1000 10/07/21 1449 10/07/21 1737 10/08/21 0600   BP: 122/83 106/75 111/74 100/39   Pulse: 76 87 82 67   Resp: 18 18 18 16   Temp: 36.1 °C (97 °F) 36.8 °C (98.3 °F) 36.7 °C (98.1 °F) 36.2 °C (97.2 °F)   TempSrc: Temporal Temporal Temporal Temporal   SpO2: 98% 98% 99% 100%   Weight:       Height:         Abdomen: soft non tender fundus  Perineum: no sig bleeding or discharge  Ext: non tender calves    No intake or output data in the 24 hours ending 10/08/21 0810    Current Facility-Administered Medications   Medication Dose Route Frequency Provider Last Rate Last Admin   • oxytocin (PITOCIN) 20 UNITS/1000ML LR (induction of labor)  0.5-20 rosas-units/min Intravenous Continuous Siddhartha Parisi M.D. 24 mL/hr at 10/06/21 0758 8 rosas-units/min at 10/06/21 0758   • lactated ringers infusion   Intravenous Continuous Siddhartha Parisi M.D. 125 mL/hr at 10/06/21 0921 New Bag at 10/06/21 0921   • ondansetron (ZOFRAN ODT) dispertab 4 mg  4 mg Oral Q6HRS PRN Siddhartha Parisi M.D.        Or   • ondansetron (ZOFRAN) syringe/vial injection 4 mg  4 mg Intravenous Q6HRS PRN Siddhartha Parisi M.D.       • oxytocin (PITOCIN) infusion (for postpartum)   mL/hr Intravenous Continuous Siddhartha Parisi M.D. 125 mL/hr at 10/06/21 1445 125 mL/hr at 10/06/21 1445   • ibuprofen (MOTRIN) tablet 600 mg  600 mg Oral Q6HRS PRN Siddhartha Parisi M.D.   600 mg at 10/07/21 2243   • acetaminophen (Tylenol) tablet 325 mg  325 mg Oral Q4HRS PRN Siddhartha Parisi M.D.       • HYDROcodone-acetaminophen (NORCO) 5-325 MG per tablet 1 Tablet  1 Tablet Oral  Q4HRS PRN Siddhartha Parisi M.D.   1 Tablet at 10/07/21 1536   • HYDROcodone-acetaminophen (NORCO) 5-325 MG per tablet 2 Tablet  2 Tablet Oral Q4HRS PRN Siddhartha Parisi M.D.   2 Tablet at 10/07/21 2243   • ropivacaine 0.2 % (NAROPIN) injection   Epidural Continuous Kyle Garcia M.D.   New Bag at 10/06/21 1010   • LR infusion   Intravenous PRN Siddhartha Parisi M.D.       • tetanus-dipth-acell pertussis (Tdap) inj 0.5 mL  0.5 mL Intramuscular Once PRN Siddhartha Parisi M.D.       • measles, mumps and rubella vaccine (MMR) injection 0.5 mL  0.5 mL Subcutaneous Once PRN Siddhartha Parisi M.D.       • PRN oxytocin (PITOCIN) (20 Units/1000 mL) PRN for excessive uterine bleeding - See Admin Instr  125-999 mL/hr Intravenous Once PRN Siddhartha Parisi M.D.       • miSOPROStol (CYTOTEC) tablet 600 mcg  600 mcg Rectal Once PRN Siddhartha Parisi M.D.       • methylergonovine (METHERGINE) injection 0.2 mg  0.2 mg Intramuscular Once PRN Siddhartha Parisi M.D.       • carboPROST (HEMABATE) injection 250 mcg  250 mcg Intramuscular Once PRN Siddhartha Parisi M.D.       • docusate sodium (COLACE) capsule 100 mg  100 mg Oral BID PRN Siddhartha Parisi M.D.   100 mg at 10/06/21 2111   • bisacodyl (DULCOLAX) suppository 10 mg  10 mg Rectal PRN Siddhartha Parisi M.D.       • magnesium hydroxide (MILK OF MAGNESIA) suspension 30 mL  30 mL Oral Q6HRS PRN Siddhartha Parisi M.D.       • prenatal plus vitamin (STUARTNATAL 1+1) 27-1 MG tablet 1 Tablet  1 Tablet Oral Daily-0800 Siddhartha Parisi M.D.   1 Tablet at 10/07/21 0757       A/P S/P  now PPD #2. Plan to proceed with discharge home with f/u in 6 weeks. Discharge instructions given and pain medications have been sent to her pharmacy.
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
## 2021-10-08 NOTE — DISCHARGE INSTRUCTIONS
PATIENT DISCHARGE EDUCATION INSTRUCTION SHEET  REASONS TO CALL YOUR OBSTETRICIAN  · Persistent fever, shaking, chills (Temperature higher than 100.4) may indicate you have an infection  · Heavy bleeding: soaking more than 1 pad per hour; Passing clots an egg-sized clot or bigger may mean you have an postpartum hemorrhage  · Foul odor from vagina or bad smelling or discolored discharge or blood  · Breast infection (Mastitis symptoms); breast pain, chills, fever, redness or red streaks, may feel flu like symptoms  · Urinary pain, burning or frequency  · Incision that is not healing, increased redness, swelling, tenderness or pain, or any pus from episiotomy or  site may mean you have an infection  · Redness, swelling, warmth, or painful to touch in the calf area of your leg may mean you have a blood clot  · Severe or intensified depression, thoughts or feelings of wanting to hurt yourself or someone else   · Pain in chest, obstructed breathing or shortness of breath (trouble catching your breath) may mean you are having a postpartum complication. Call your provider immediately   · Headache that does not get better, even after taking medicine, a bad headache with vision changes or pain in the upper right area of your belly may mean you have high blood pressure or post birth preeclampsia. Call your provider immediately    HAND WASHING  All family and friends should wash their hands:  · Before and after holding the baby  · Before feeding the baby  · After using the restroom or changing the baby's diaper    WOUND CARE  Ask your physician for additional care instructions. In general:  Do NOT lift anything heavier than your baby (over 10 pounds)  · Encourage family to help participate in care of the  to allow rest and mom time to heal  · Episiotomy/Laceration  · May use daphnie-spray bottle, witch hazel pads and dermaplast spray for comfort  · Use daphnie-spray bottle after urinating to cleanse perineal area  · To  prevent burning during urination spray daphnie-water bottle on labial area   · Pat perineal area dry until episiotomy/laceration is healed  · Continue to use daphnie-bottle until bleeding stops as needed  · If have a 2nd degree laceration or greater, a Sitz bath can offer relief from soreness, burning, and inflammation   · Sitz Bath   · Sit in 6 inches of warm water and soak laceration as needed until the laceration heals    VAGINAL CARE AND BLEEDING  · Nothing inside vagina for 6 weeks:   · No sexual intercourse, tampons or douching  · Bleeding may continue for 2-4 weeks. Amount and color may vary  · Soaking 1 pad or more in an hour for several hours is considered heavy bleeding  · Passing large egg sized blood clots can be concerning  · If you feel like you have heavy bleeding or are having increasing amount of blood clots call your Obstetrician immediately  · If you begin feeling faint upon standing, feeling sick to your stomach, have clammy skin, a really fast heartbeat, have chills, start feeling confused, dizzy, sleepy or weak, or feeling like you're going to faint call your Obstetrician immediately    HYPERTENSION   Preeclampsia or gestational hypertension are types of high blood pressure that only pregnant women can get. It is important for you to be aware of symptoms to seek early intervention and treatment. If you have any of these symptoms immediately call your Obstetrician    · Vision changes or blurred vision   · Severe headache or pain that is unrelieved with medication and will not go away  · Persistent pain in upper abdomen or shoulder   · Increased swelling of face, feet, or hands  · Difficulty breathing or shortness of breath at rest  · Urinating less than usual    URINATION AND BOWEL MOVEMENTS  · Eating more fiber (bran cereal, fruits, and vegetables) and drinking plenty of fluids will help to avoid constipation  · Urinary frequency and urgency after childbirth is normal  · If you experience any urinary  "pain, burning or frequency call your provider    BIRTH CONTROL  · It is possible to become pregnant at any time after delivery and while breastfeeding  · Plan to discuss a method of birth control with your physician at your post delivery follow up visit    POSTPARTUM BLUES  During the first few days after birth, you may experience a sense of the \"blues\" which may include impatience, irritability or even crying. These feelings come and go quickly. However, as many as 1 in 10 women experience emotional symptoms known as postpartum depression.     POSTPARTUM DEPRESSION    May start as early as the second or third day after delivery or take several weeks or months to develop. Symptoms of \"blues\" are present, but are more intense: Crying spells; loss of appetite; feelings of hopelessness or loss of control; fear of touching the baby; over concern or no concern at all about the baby; little or no concern about your own appearance/caring for yourself; and/or inability to sleep or excessive sleeping. Contact your Obstetrician if you are experiencing any of these symptoms     PREVENTING SHAKEN BABY  If you are angry or stressed, PUT THE BABY IN THE CRIB, step away, take some deep breaths, and wait until you are calm to care for the baby. DO NOT SHAKE THE BABY. You are not alone, call a supporter for help.  · [facility] 24/7 crisis call line (612-361-1931) or (1-309.661.7780)  · You can also text them, text \"ANSWER\" (617566)      "

## 2021-10-08 NOTE — PROGRESS NOTES
0700: Report received from [facility] RNAnnie. 12 hour chart check completed and orders/MAR reviewed.     0830: Mother and  found co-sleeping in bed. Mother woken and safe sleep education reinforced while  was returned to crib for assessment. Assessment completed. Discussed pain management plan and patient to call when medication is needed. Patient denies dizziness and headaches; states she is voiding w/o difficulty. Reviewed plan of care, all questions answered, and rounding in place.

## 2021-10-08 NOTE — DISCHARGE SUMMARY
DATE OF ADMISSION:  10/06/2021   DATE OF DISCHARGE:  10/08/2021     DISCHARGE DIAGNOSES:  1.  Status post a spontaneous vaginal delivery.  2.  Uncomplicated postpartum course.     HISTORY OF PRESENT ILLNESS:  This is a 26-year-old  7, para 4,   spontaneous  2, established care at 10 weeks' gestation, who did lose   her care; however, represented at term, now electing to proceed forward with   an induction of labor, started on Pitocin per protocol with some    testing just prior to delivery, which was reassuring.     Past medical history and physical exam can be found in dictated history and   physical.     ASSESSMENT AND PLAN:  A 26-year-old  7, para 4 at term, now electing to   proceed forward with an induction of labor with Pitocin per protocol.  Of   note, she does state that she had had asthma exacerbations during the course   of her pregnancy, which had been exacerbated more recently prior to   re-presentation to care.  She did have a favorable cervix, was to be started   on Pitocin per protocol and wished to proceed forward with delivery at this   time.     HOSPITAL COURSE:  As stated above, the patient was admitted.  She did receive   increasing dose of Pitocin per protocol.  She did have an artificial rupture   of membranes and approximately 3 cm dilation.  Clear amniotic fluid was   appreciated.  Intrauterine pressure catheter was placed to assist with Pitocin   augmentation.  Continuous lumbar epidural was granted to optimize pain   management.  She progressed well, subsequently underwent a spontaneous vaginal   delivery.  Apgars were 8 and 9 at 1 and 5 minutes respectively with an   estimated blood loss of 300 mL.  Her postpartum course was unremarkable.  On   postpartum day #2, she was ambulating, voiding well, tolerating a regular   diet.  She was breastfeeding well.  She was felt to be appropriate for   discharge with a discharge plan to follow up in 6 weeks.      DISCHARGE INSTRUCTIONS:  She is to call with any increased temperature greater   than 100.4, increasing vaginal bleeding, abdominal pain unrelieved with any   p.o. pain medication.  Call with any other questions or concerns.     DISCHARGE MEDICATIONS:  Motrin and Norco.        ______________________________  MD KYLE Cotton/GIGI    DD:  10/08/2021 08:10  DT:  10/08/2021 08:32    Job#:  [id number]

## 2021-12-24 ENCOUNTER — HOSPITAL ENCOUNTER (EMERGENCY)
Facility: MEDICAL CENTER | Age: 26
End: 2021-12-24
Attending: EMERGENCY MEDICINE
Payer: MEDICAID

## 2021-12-24 ENCOUNTER — APPOINTMENT (OUTPATIENT)
Dept: RADIOLOGY | Facility: MEDICAL CENTER | Age: 26
End: 2021-12-24
Attending: EMERGENCY MEDICINE
Payer: MEDICAID

## 2021-12-24 VITALS
HEART RATE: 67 BPM | TEMPERATURE: 96.8 F | OXYGEN SATURATION: 97 % | HEIGHT: 62 IN | RESPIRATION RATE: 18 BRPM | SYSTOLIC BLOOD PRESSURE: 109 MMHG | WEIGHT: 181.22 LBS | BODY MASS INDEX: 33.35 KG/M2 | DIASTOLIC BLOOD PRESSURE: 67 MMHG

## 2021-12-24 DIAGNOSIS — F41.8 SITUATIONAL ANXIETY: ICD-10-CM

## 2021-12-24 DIAGNOSIS — S09.93XA FACIAL TRAUMA, INITIAL ENCOUNTER: ICD-10-CM

## 2021-12-24 PROCEDURE — 70450 CT HEAD/BRAIN W/O DYE: CPT

## 2021-12-24 PROCEDURE — 70486 CT MAXILLOFACIAL W/O DYE: CPT

## 2021-12-24 PROCEDURE — 99283 EMERGENCY DEPT VISIT LOW MDM: CPT | Mod: 25
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
## 2021-12-25 NOTE — DISCHARGE INSTRUCTIONS
You may use ice packs laying on the area that hurts and you may use Tylenol and ibuprofen if needed for pain.  If you have any feelings that you may wish to harm yourself or others or otherwise feel that you need to see the doctor or need emergency medical care return here at once for recheck
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
## 2021-12-25 NOTE — ED NOTES
Pt cleared for discharge by ERP.    Discharge instructions given and explained; patient verbalized understanding. Pt discharged in stable condition.

## 2022-03-21 ENCOUNTER — OFFICE VISIT (OUTPATIENT)
Dept: URGENT CARE | Facility: CLINIC | Age: 27
End: 2022-03-21
Payer: MEDICAID

## 2022-03-21 VITALS
WEIGHT: 183.8 LBS | SYSTOLIC BLOOD PRESSURE: 122 MMHG | HEART RATE: 89 BPM | BODY MASS INDEX: 34.7 KG/M2 | RESPIRATION RATE: 16 BRPM | TEMPERATURE: 97.9 F | HEIGHT: 61 IN | DIASTOLIC BLOOD PRESSURE: 74 MMHG | OXYGEN SATURATION: 97 %

## 2022-03-21 DIAGNOSIS — R10.33 PERIUMBILICAL ABDOMINAL PAIN: ICD-10-CM

## 2022-03-21 LAB
APPEARANCE UR: NORMAL
BILIRUB UR STRIP-MCNC: NORMAL MG/DL
COLOR UR AUTO: NORMAL
GLUCOSE UR STRIP.AUTO-MCNC: NEGATIVE MG/DL
INT CON NEG: NEGATIVE
INT CON POS: POSITIVE
KETONES UR STRIP.AUTO-MCNC: NORMAL MG/DL
LEUKOCYTE ESTERASE UR QL STRIP.AUTO: NORMAL
NITRITE UR QL STRIP.AUTO: POSITIVE
PH UR STRIP.AUTO: 5.5 [PH] (ref 5–8)
POC URINE PREGNANCY TEST: NEGATIVE
PROT UR QL STRIP: NORMAL MG/DL
RBC UR QL AUTO: NORMAL
SP GR UR STRIP.AUTO: 1.02
UROBILINOGEN UR STRIP-MCNC: NORMAL MG/DL

## 2022-03-21 PROCEDURE — 81002 URINALYSIS NONAUTO W/O SCOPE: CPT | Performed by: STUDENT IN AN ORGANIZED HEALTH CARE EDUCATION/TRAINING PROGRAM

## 2022-03-21 PROCEDURE — 81025 URINE PREGNANCY TEST: CPT | Performed by: STUDENT IN AN ORGANIZED HEALTH CARE EDUCATION/TRAINING PROGRAM

## 2022-03-21 PROCEDURE — 99205 OFFICE O/P NEW HI 60 MIN: CPT | Performed by: STUDENT IN AN ORGANIZED HEALTH CARE EDUCATION/TRAINING PROGRAM

## 2022-03-22 NOTE — PROGRESS NOTES
Subjective:   CHIEF COMPLAINT  Chief Complaint   Patient presents with   • GI Problem     Vomiting, wants a pregnancy, bleeding on and off x 1 week        HPI  Princess Sarah Santoro is a 26 y.o. female who presents with a chief complaint of periumbilical abdominal pain associated with vomiting for approximately 1 week.  Says the symptoms have been getting worse over the last 24 to 48 hours and says she has not been able to hold anything down.  Symptoms are triggered with food.  She has tried taking Advil which not helped.  Abdominal discomfort radiates towards the left upper quadrant, under the breast.  Positive ROS for persistent vaginal bleeding following vaginal delivery on 10/6/2021.  She is not experiencing any dysuria.  No hematuria.  Reports intermittent MJ use.    REVIEW OF SYSTEMS  General: no fever or chills  GI: no nausea or vomiting  See HPI for further details.    PAST MEDICAL HISTORY  Patient Active Problem List    Diagnosis Date Noted   • Iron deficiency anemia, unspecified 06/21/2021       SURGICAL HISTORY  patient denies any surgical history    ALLERGIES  Allergies   Allergen Reactions   • Peanut (Diagnostic) Itching and Swelling       CURRENT MEDICATIONS  Home Medications    **Home medications have not yet been reviewed for this encounter**         SOCIAL HISTORY  Social History     Tobacco Use   • Smoking status: Current Every Day Smoker     Packs/day: 0.25     Types: Cigarettes   • Smokeless tobacco: Never Used   Vaping Use   • Vaping Use: Never used   Substance and Sexual Activity   • Alcohol use: Yes     Alcohol/week: 8.4 oz     Types: 6 Glasses of wine, 6 Cans of beer, 2 Shots of liquor per week   • Drug use: Not Currently   • Sexual activity: Yes     Partners: Male     Birth control/protection: None       FAMILY HISTORY  No family history on file.       Objective:   PHYSICAL EXAM  VITAL SIGNS: /74 (BP Location: Right arm, Patient Position: Sitting, BP Cuff Size: Adult)   Pulse 89    "Temp 36.6 °C (97.9 °F) (Temporal)   Resp 16   Ht 1.549 m (5' 1\")   Wt 83.4 kg (183 lb 12.8 oz)   SpO2 97%   BMI 34.73 kg/m²     Gen: no acute distress, normal voice  Skin: dry, intact, moist mucosal membranes  Lungs: CTAB w/ symmetric expansion  CV: RRR w/o murmurs or clicks  Abdomen: Bowel sounds normal, soft, very mild global tenderness; no rebound or guarding.     Psych: normal affect, normal judgement, alert, awake    Assessment/Plan:     1. Periumbilical abdominal pain  POCT PREGNANCY    POCT Urinalysis   Differentials are broad.  Patient has not been able to down food so she was sent to the emergency room for further evaluation.  Patient was accompanied by a friend who will be taking her to the ED.  She understood everything discussed.  All questions were answered.    Differential diagnosis, natural history, supportive care, and indications for immediate follow-up discussed. All questions answered. Patient agrees with the plan of care.    Follow-up as needed if symptoms worsen or fail to improve to PCP, Urgent care or Emergency Room.    Please note that this dictation was created using voice recognition software. I have made a reasonable attempt to correct obvious errors, but I expect that there are errors of grammar and possibly content that I did not discover before finalizing the note.         "

## 2022-03-23 ENCOUNTER — HOSPITAL ENCOUNTER (EMERGENCY)
Facility: MEDICAL CENTER | Age: 27
End: 2022-03-24
Attending: EMERGENCY MEDICINE
Payer: MEDICAID

## 2022-03-23 DIAGNOSIS — F10.920 ALCOHOLIC INTOXICATION WITHOUT COMPLICATION (HCC): ICD-10-CM

## 2022-03-23 DIAGNOSIS — S80.212A KNEE ABRASION, LEFT, INITIAL ENCOUNTER: ICD-10-CM

## 2022-03-23 DIAGNOSIS — M79.602 LEFT ARM PAIN: ICD-10-CM

## 2022-03-23 DIAGNOSIS — R10.13 EPIGASTRIC PAIN: ICD-10-CM

## 2022-03-23 PROCEDURE — 36415 COLL VENOUS BLD VENIPUNCTURE: CPT

## 2022-03-23 PROCEDURE — 99285 EMERGENCY DEPT VISIT HI MDM: CPT

## 2022-03-23 RX ORDER — ACETAMINOPHEN 325 MG/1
650 TABLET ORAL ONCE
Status: COMPLETED | OUTPATIENT
Start: 2022-03-24 | End: 2022-03-24

## 2022-03-24 ENCOUNTER — HOSPITAL ENCOUNTER (OUTPATIENT)
Dept: RADIOLOGY | Facility: MEDICAL CENTER | Age: 27
End: 2022-03-24
Attending: EMERGENCY MEDICINE
Payer: MEDICAID

## 2022-03-24 VITALS
SYSTOLIC BLOOD PRESSURE: 105 MMHG | DIASTOLIC BLOOD PRESSURE: 64 MMHG | OXYGEN SATURATION: 98 % | BODY MASS INDEX: 33.99 KG/M2 | RESPIRATION RATE: 14 BRPM | WEIGHT: 180 LBS | HEART RATE: 90 BPM | TEMPERATURE: 97.3 F | HEIGHT: 61 IN

## 2022-03-24 LAB
ALBUMIN SERPL BCP-MCNC: 3.9 G/DL (ref 3.2–4.9)
ALBUMIN/GLOB SERPL: 1.9 G/DL
ALP SERPL-CCNC: 74 U/L (ref 30–99)
ALT SERPL-CCNC: 15 U/L (ref 2–50)
ANION GAP SERPL CALC-SCNC: 16 MMOL/L (ref 7–16)
AST SERPL-CCNC: 18 U/L (ref 12–45)
BASOPHILS # BLD AUTO: 0 % (ref 0–1.8)
BASOPHILS # BLD: 0 K/UL (ref 0–0.12)
BILIRUB SERPL-MCNC: 0.2 MG/DL (ref 0.1–1.5)
BUN SERPL-MCNC: 12 MG/DL (ref 8–22)
CALCIUM SERPL-MCNC: 8.6 MG/DL (ref 8.5–10.5)
CHLORIDE SERPL-SCNC: 111 MMOL/L (ref 96–112)
CO2 SERPL-SCNC: 19 MMOL/L (ref 20–33)
CREAT SERPL-MCNC: 0.76 MG/DL (ref 0.5–1.4)
EOSINOPHIL # BLD AUTO: 0.11 K/UL (ref 0–0.51)
EOSINOPHIL NFR BLD: 1.7 % (ref 0–6.9)
ERYTHROCYTE [DISTWIDTH] IN BLOOD BY AUTOMATED COUNT: 50.8 FL (ref 35.9–50)
ETHANOL BLD-MCNC: 188 MG/DL (ref 0–10)
GFR SERPLBLD CREATININE-BSD FMLA CKD-EPI: 110 ML/MIN/1.73 M 2
GLOBULIN SER CALC-MCNC: 2.1 G/DL (ref 1.9–3.5)
GLUCOSE SERPL-MCNC: 86 MG/DL (ref 65–99)
HCT VFR BLD AUTO: 36.5 % (ref 37–47)
HGB BLD-MCNC: 11.3 G/DL (ref 12–16)
LIPASE SERPL-CCNC: 21 U/L (ref 11–82)
LYMPHOCYTES # BLD AUTO: 3.48 K/UL (ref 1–4.8)
LYMPHOCYTES NFR BLD: 55.3 % (ref 22–41)
MANUAL DIFF BLD: NORMAL
MCH RBC QN AUTO: 28.1 PG (ref 27–33)
MCHC RBC AUTO-ENTMCNC: 31 G/DL (ref 33.6–35)
MCV RBC AUTO: 90.8 FL (ref 81.4–97.8)
MONOCYTES # BLD AUTO: 0.11 K/UL (ref 0–0.85)
MONOCYTES NFR BLD AUTO: 1.8 % (ref 0–13.4)
MORPHOLOGY BLD-IMP: NORMAL
NEUTROPHILS # BLD AUTO: 2.6 K/UL (ref 2–7.15)
NEUTROPHILS NFR BLD: 41.2 % (ref 44–72)
NRBC # BLD AUTO: 0 K/UL
NRBC BLD-RTO: 0 /100 WBC
PLATELET # BLD AUTO: 207 K/UL (ref 164–446)
PLATELET BLD QL SMEAR: NORMAL
PMV BLD AUTO: 9.4 FL (ref 9–12.9)
POTASSIUM SERPL-SCNC: 3.8 MMOL/L (ref 3.6–5.5)
PROT SERPL-MCNC: 6 G/DL (ref 6–8.2)
RBC # BLD AUTO: 4.02 M/UL (ref 4.2–5.4)
RBC BLD AUTO: NORMAL
SODIUM SERPL-SCNC: 146 MMOL/L (ref 135–145)
WBC # BLD AUTO: 6.3 K/UL (ref 4.8–10.8)

## 2022-03-24 PROCEDURE — 700102 HCHG RX REV CODE 250 W/ 637 OVERRIDE(OP): Performed by: EMERGENCY MEDICINE

## 2022-03-24 PROCEDURE — A9270 NON-COVERED ITEM OR SERVICE: HCPCS | Performed by: EMERGENCY MEDICINE

## 2022-03-24 PROCEDURE — 80053 COMPREHEN METABOLIC PANEL: CPT

## 2022-03-24 PROCEDURE — 73090 X-RAY EXAM OF FOREARM: CPT | Mod: LT

## 2022-03-24 PROCEDURE — 83690 ASSAY OF LIPASE: CPT

## 2022-03-24 PROCEDURE — 85027 COMPLETE CBC AUTOMATED: CPT

## 2022-03-24 PROCEDURE — 73562 X-RAY EXAM OF KNEE 3: CPT | Mod: LT

## 2022-03-24 PROCEDURE — 73030 X-RAY EXAM OF SHOULDER: CPT | Mod: LT

## 2022-03-24 PROCEDURE — 85007 BL SMEAR W/DIFF WBC COUNT: CPT

## 2022-03-24 PROCEDURE — 82077 ASSAY SPEC XCP UR&BREATH IA: CPT

## 2022-03-24 PROCEDURE — 76705 ECHO EXAM OF ABDOMEN: CPT

## 2022-03-24 RX ORDER — OMEPRAZOLE 20 MG/1
20 CAPSULE, DELAYED RELEASE ORAL DAILY
Qty: 30 CAPSULE | Refills: 0 | Status: SHIPPED | OUTPATIENT
Start: 2022-03-24

## 2022-03-24 RX ADMIN — ACETAMINOPHEN 650 MG: 325 TABLET, FILM COATED ORAL at 00:25

## 2022-03-24 ASSESSMENT — PAIN DESCRIPTION - PAIN TYPE: TYPE: ACUTE PAIN

## 2022-03-24 NOTE — ED NOTES
Pt sleeping in gurney comfortably. Pt remains connected to monitor. No acute distress noted. Will continue to monitor.
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
## 2022-03-24 NOTE — ED NOTES
Knee wrapped with ace wrap per ERP orders. Pt still very sleepy at this time and unable to wake up enough for safe discharge. ERP notified. Will continue to monitor.
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
## 2022-03-24 NOTE — ED NOTES
Pt sleeping comfortably in [facility]. No acute distress noted. Will continue to monitor.
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
